# Patient Record
Sex: MALE | Race: OTHER | NOT HISPANIC OR LATINO | URBAN - METROPOLITAN AREA
[De-identification: names, ages, dates, MRNs, and addresses within clinical notes are randomized per-mention and may not be internally consistent; named-entity substitution may affect disease eponyms.]

---

## 2018-09-27 ENCOUNTER — EMERGENCY (EMERGENCY)
Facility: HOSPITAL | Age: 73
LOS: 1 days | Discharge: HOME | End: 2018-09-27
Attending: EMERGENCY MEDICINE
Payer: MEDICARE

## 2018-09-27 VITALS
HEART RATE: 78 BPM | DIASTOLIC BLOOD PRESSURE: 88 MMHG | RESPIRATION RATE: 14 BRPM | OXYGEN SATURATION: 100 % | TEMPERATURE: 98 F | WEIGHT: 134.92 LBS | SYSTOLIC BLOOD PRESSURE: 163 MMHG

## 2018-09-27 LAB
ANION GAP SERPL CALC-SCNC: 5 MMOL/L — SIGNIFICANT CHANGE UP (ref 5–17)
BUN SERPL-MCNC: 16 MG/DL — SIGNIFICANT CHANGE UP (ref 7–23)
CALCIUM SERPL-MCNC: 8.8 MG/DL — SIGNIFICANT CHANGE UP (ref 8.5–10.1)
CHLORIDE SERPL-SCNC: 106 MMOL/L — SIGNIFICANT CHANGE UP (ref 96–108)
CO2 SERPL-SCNC: 30 MMOL/L — SIGNIFICANT CHANGE UP (ref 22–31)
CREAT SERPL-MCNC: 0.88 MG/DL — SIGNIFICANT CHANGE UP (ref 0.5–1.3)
GLUCOSE SERPL-MCNC: 97 MG/DL — SIGNIFICANT CHANGE UP (ref 70–99)
HCT VFR BLD CALC: 39.9 % — SIGNIFICANT CHANGE UP (ref 39–50)
HGB BLD-MCNC: 13 G/DL — SIGNIFICANT CHANGE UP (ref 13–17)
MCHC RBC-ENTMCNC: 28.8 PG — SIGNIFICANT CHANGE UP (ref 27–34)
MCHC RBC-ENTMCNC: 32.6 GM/DL — SIGNIFICANT CHANGE UP (ref 32–36)
MCV RBC AUTO: 88.3 FL — SIGNIFICANT CHANGE UP (ref 80–100)
NRBC # BLD: 0 /100 WBCS — SIGNIFICANT CHANGE UP (ref 0–0)
PLATELET # BLD AUTO: 279 K/UL — SIGNIFICANT CHANGE UP (ref 150–400)
POTASSIUM SERPL-MCNC: 3.7 MMOL/L — SIGNIFICANT CHANGE UP (ref 3.5–5.3)
POTASSIUM SERPL-SCNC: 3.7 MMOL/L — SIGNIFICANT CHANGE UP (ref 3.5–5.3)
RBC # BLD: 4.52 M/UL — SIGNIFICANT CHANGE UP (ref 4.2–5.8)
RBC # FLD: 14.2 % — SIGNIFICANT CHANGE UP (ref 10.3–14.5)
SODIUM SERPL-SCNC: 141 MMOL/L — SIGNIFICANT CHANGE UP (ref 135–145)
TROPONIN I SERPL-MCNC: <.015 NG/ML — SIGNIFICANT CHANGE UP (ref 0.01–0.04)
WBC # BLD: 6.93 K/UL — SIGNIFICANT CHANGE UP (ref 3.8–10.5)
WBC # FLD AUTO: 6.93 K/UL — SIGNIFICANT CHANGE UP (ref 3.8–10.5)

## 2018-09-27 PROCEDURE — 71046 X-RAY EXAM CHEST 2 VIEWS: CPT

## 2018-09-27 PROCEDURE — 80048 BASIC METABOLIC PNL TOTAL CA: CPT

## 2018-09-27 PROCEDURE — 36415 COLL VENOUS BLD VENIPUNCTURE: CPT

## 2018-09-27 PROCEDURE — 85027 COMPLETE CBC AUTOMATED: CPT

## 2018-09-27 PROCEDURE — 99283 EMERGENCY DEPT VISIT LOW MDM: CPT

## 2018-09-27 PROCEDURE — 84484 ASSAY OF TROPONIN QUANT: CPT

## 2018-09-27 PROCEDURE — 71046 X-RAY EXAM CHEST 2 VIEWS: CPT | Mod: 26

## 2018-09-27 PROCEDURE — 93005 ELECTROCARDIOGRAM TRACING: CPT

## 2018-09-27 RX ORDER — RANITIDINE HYDROCHLORIDE 150 MG/1
1 TABLET, FILM COATED ORAL
Qty: 0 | Refills: 0 | COMMUNITY

## 2018-09-27 RX ORDER — TAMSULOSIN HYDROCHLORIDE 0.4 MG/1
1 CAPSULE ORAL
Qty: 0 | Refills: 0 | COMMUNITY

## 2018-09-27 RX ORDER — ASPIRIN/CALCIUM CARB/MAGNESIUM 324 MG
325 TABLET ORAL ONCE
Qty: 0 | Refills: 0 | Status: COMPLETED | OUTPATIENT
Start: 2018-09-27 | End: 2018-09-27

## 2018-09-27 RX ORDER — SIMVASTATIN 20 MG/1
1 TABLET, FILM COATED ORAL
Qty: 0 | Refills: 0 | COMMUNITY

## 2018-09-27 RX ORDER — LOSARTAN POTASSIUM 100 MG/1
0 TABLET, FILM COATED ORAL
Qty: 0 | Refills: 0 | COMMUNITY

## 2018-09-27 RX ORDER — ASPIRIN/CALCIUM CARB/MAGNESIUM 324 MG
1 TABLET ORAL
Qty: 0 | Refills: 0 | COMMUNITY

## 2018-09-27 RX ADMIN — Medication 325 MILLIGRAM(S): at 23:01

## 2018-09-27 NOTE — ED PROVIDER NOTE - OBJECTIVE STATEMENT
71 yo male with H/O HTN, High Cholesterol and GERD who presents here this evening for evaluation of chest pain, referred here from [facility]. States that he has 2-3 months of localized left lateral chest discomfort described as dull, non radiating w/o associated symptoms lasting a minimum of 4-5 hours per day, everyday. Exertion does not increase pain and rest does not decrease symptoms. No SOB/Cough/Fever or Chills and in addition no back or neck pain.

## 2018-09-27 NOTE — ED ADULT NURSE NOTE - NSIMPLEMENTINTERV_GEN_ALL_ED
Implemented All Universal Safety Interventions:  [place] to call system. Call bell, personal items and telephone within reach. Instruct patient to call for assistance. Room bathroom lighting operational. Non-slip footwear when patient is off stretcher. Physically safe environment: no spills, clutter or unnecessary equipment. Stretcher in lowest position, wheels locked, appropriate side rails in place.

## 2018-09-27 NOTE — ED ADULT TRIAGE NOTE - NS ED TRIAGE HISTORIAN
Heart size within normal limits. Pulmonary vasculature within normal limits. No focal consolidation.
 Emphysematous change. Subluxed or dislocated left shoulder with deformity of the humeral head. Patient

## 2018-10-31 PROBLEM — K21.9 GASTRO-ESOPHAGEAL REFLUX DISEASE WITHOUT ESOPHAGITIS: Chronic | Status: ACTIVE | Noted: 2018-09-27

## 2018-10-31 PROBLEM — I10 ESSENTIAL (PRIMARY) HYPERTENSION: Chronic | Status: ACTIVE | Noted: 2018-09-27

## 2018-10-31 PROBLEM — E78.00 PURE HYPERCHOLESTEROLEMIA, UNSPECIFIED: Chronic | Status: ACTIVE | Noted: 2018-09-27

## 2018-11-15 ENCOUNTER — OUTPATIENT (OUTPATIENT)
Dept: OUTPATIENT SERVICES | Facility: HOSPITAL | Age: 73
LOS: 1 days | End: 2018-11-15

## 2018-11-15 ENCOUNTER — APPOINTMENT (OUTPATIENT)
Dept: CV DIAGNOSTICS | Facility: HOSPITAL | Age: 73
End: 2018-11-15
Payer: MEDICARE

## 2018-11-15 DIAGNOSIS — R07.89 OTHER CHEST PAIN: ICD-10-CM

## 2018-11-15 PROCEDURE — 93016 CV STRESS TEST SUPVJ ONLY: CPT | Mod: GC

## 2018-11-15 PROCEDURE — 78452 HT MUSCLE IMAGE SPECT MULT: CPT | Mod: 26

## 2018-11-15 PROCEDURE — 93018 CV STRESS TEST I&R ONLY: CPT | Mod: GC

## 2022-08-26 DIAGNOSIS — M25.812 OTHER SPECIFIED JOINT DISORDERS, LEFT SHOULDER: ICD-10-CM

## 2022-08-31 ENCOUNTER — APPOINTMENT (OUTPATIENT)
Dept: ORTHOPEDIC SURGERY | Facility: CLINIC | Age: 77
End: 2022-08-31

## 2022-08-31 VITALS — HEIGHT: 62 IN | BODY MASS INDEX: 24.29 KG/M2 | WEIGHT: 132 LBS

## 2022-08-31 DIAGNOSIS — I10 ESSENTIAL (PRIMARY) HYPERTENSION: ICD-10-CM

## 2022-08-31 DIAGNOSIS — Z00.00 ENCOUNTER FOR GENERAL ADULT MEDICAL EXAMINATION W/OUT ABNORMAL FINDINGS: ICD-10-CM

## 2022-08-31 PROCEDURE — 99214 OFFICE O/P EST MOD 30 MIN: CPT

## 2022-08-31 PROCEDURE — 73010 X-RAY EXAM OF SHOULDER BLADE: CPT | Mod: RT

## 2022-08-31 PROCEDURE — 73030 X-RAY EXAM OF SHOULDER: CPT | Mod: RT

## 2022-08-31 RX ORDER — METHYLPREDNISOLONE 4 MG/1
4 TABLET ORAL
Qty: 1 | Refills: 0 | Status: ACTIVE | COMMUNITY
Start: 2022-08-31 | End: 1900-01-01

## 2022-08-31 NOTE — REASON FOR VISIT
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  Left shoulder pain began in March 2021, after getting second COVID vaccine. Celebrex and PT helped at the time.  The right shoulder pain began May 2022, without injury. Reaching is  painful. Night symptoms can occur. There can be some n/t.

## 2022-08-31 NOTE — HISTORY OF PRESENT ILLNESS
[8] : 8 [5] : 5 [Dull/Aching] : dull/aching [Household chores] : household chores [Leisure] : leisure [Work] : work [Sleep] : sleep [Lying in bed] : lying in bed [Full time] : Work status: full time [de-identified] : Pain in the right shoulder for the last 2 months after having the COVID booster.  [] : no [FreeTextEntry1] : right shoulder [FreeTextEntry6] : itching [FreeTextEntry7] : to his neck [FreeTextEntry9] : diclofenac gel

## 2022-08-31 NOTE — ASSESSMENT
[FreeTextEntry1] : We discussed the underlying pathology. \par Treatment options reviewed. \par PT is prescribed. \par MDP is prescribed. \par Follow up in 2 months. \par If symptoms persist, an MRI may be considered. \par Cautions discussed. \par Questions answered. \par \par Patient seen by Semaj Hernandez M.D.\par Entered by Abimbola Brown acting as scribe.

## 2022-08-31 NOTE — IMAGING
[de-identified] : Right Shoulder: Inspection of the shoulder/upper arm is as follows: no swelling, no ecchymosis and no atrophy. Palpation of the shoulder/upper arm is as follows: Tenderness is noted at the greater tuberosity, no tenderness at AC joint. Range of motion of the shoulder is as follows: passive forward flexion to: 165 degrees. internal rotation to: L1 degrees. external rotation with arm to side: 60 degrees. Motion is assessed sitting. The pain at end range of motions is mild. Strength of the shoulder is as follows: There is\par discomfort with strength testing. There is 4+/5 forward flexion strength. Ligament Stability and Special Tests of the shoulder is as follows: Impingement testing is positive. Additional Ligament Stability and Special Tests of the shoulder is as follows: Lombardo's test shows positive. arc is +. Neurological testing of the shoulder is as follows: No sensory deficits.\par \par Left Shoulder: Inspection of the shoulder/upper arm is as follows: no swelling, no ecchymosis and no atrophy. Palpation of the shoulder/upper arm is as follows: Tenderness is noted at the AC joint and lateral shoulder. AC is more tender. Range of motion of the shoulder is as follows: passive forward flexion to: 165 degrees. internal rotation to: L2 degrees. external rotation with arm to side: 60 degrees. Motion is assessed sitting. The pain at end range of motions is mild. Strength of the shoulder is as follows: There is\par discomfort with strength testing. There is decent strength.. Ligament Stability and Special Tests of the shoulder is as follows: Impingement testing is positive. Additional Ligament Stability and Special Tests of the shoulder is as follows: Lombardo's test shows negative. arc is +. Neurological testing of the shoulder is as follows: No sensory deficits. [Right] : right shoulder [FreeTextEntry1] : The GH joint is OK. There is an AC spur. [FreeTextEntry5] : There is a type II acromion with a very large spur.

## 2022-08-31 NOTE — CONSULT LETTER
[Dear  ___] : Dear  [unfilled], [Consult Letter:] : I had the pleasure of evaluating your patient, [unfilled]. [Please see my note below.] : Please see my note below. [Consult Closing:] : Thank you very much for allowing me to participate in the care of this patient.  If you have any questions, please do not hesitate to contact me. [Sincerely,] : Sincerely, [FreeTextEntry3] : Semaj Shin M.D.\par Shoulder Surgery

## 2022-09-03 ENCOUNTER — NON-APPOINTMENT (OUTPATIENT)
Age: 77
End: 2022-09-03

## 2022-09-20 ENCOUNTER — APPOINTMENT (OUTPATIENT)
Dept: ORTHOPEDIC SURGERY | Facility: CLINIC | Age: 77
End: 2022-09-20

## 2022-10-19 ENCOUNTER — APPOINTMENT (OUTPATIENT)
Dept: ORTHOPEDIC SURGERY | Facility: CLINIC | Age: 77
End: 2022-10-19

## 2022-10-19 VITALS — WEIGHT: 130 LBS | BODY MASS INDEX: 23.92 KG/M2 | HEIGHT: 62 IN

## 2022-10-19 DIAGNOSIS — M25.512 PAIN IN LEFT SHOULDER: ICD-10-CM

## 2022-10-19 PROCEDURE — 99214 OFFICE O/P EST MOD 30 MIN: CPT

## 2022-10-19 RX ORDER — CELECOXIB 200 MG/1
200 CAPSULE ORAL TWICE DAILY
Qty: 60 | Refills: 0 | Status: COMPLETED | COMMUNITY
Start: 2022-10-19 | End: 2022-11-18

## 2022-10-19 NOTE — ASSESSMENT
[FreeTextEntry1] : We discussed the underlying pathology. \par PT is prescribed. \par Celebrex is renewed.\par Questions answered. \par \par Patient seen by Semaj Hernandez M.D.\par Entered by Abimbola Brown acting as scribe.

## 2022-10-19 NOTE — HISTORY OF PRESENT ILLNESS
[5] : 5 [Burning] : burning [] : yes [Constant] : constant [Sleep] : sleep [Ice] : ice [Part time] : Work status: part time [de-identified] : Patient is here for a follow up on his right shoulder. [FreeTextEntry1] : right shoulder [FreeTextEntry7] : into the bicep [de-identified] : lifting [de-identified] : Stretching

## 2022-10-19 NOTE — REASON FOR VISIT
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  Left shoulder pain began in March 2021, after getting second COVID vaccine. Celebrex and PT helped at the time.  The right shoulder pain began May 2022, without injury. Reaching is  painful. Night symptoms can occur. There can be some n/t. He has not started PT yet. Overall, he feels better though there is still pain. MDP helped a lot, and he feels 50% better.

## 2022-10-19 NOTE — IMAGING
[Right] : right shoulder [de-identified] : Right Shoulder: Inspection of the shoulder/upper arm is as follows: no swelling, no ecchymosis and no atrophy. Palpation of the shoulder/upper arm is as follows: Tenderness is noted at the greater tuberosity, no tenderness at AC joint. Range of motion of the shoulder is as follows: passive forward flexion to: 165 degrees. internal rotation to: L1 degrees. external rotation with arm to side: 60 degrees. Motion is assessed sitting. The pain at end range of motions is mild. Strength of the shoulder is as follows: There is\par discomfort with strength testing. There is 4+/5 forward flexion strength. Ligament Stability and Special Tests of the shoulder is as follows: Impingement testing is positive. Additional Ligament Stability and Special Tests of the shoulder is as follows: Lombardo's test shows positive. arc is +. Neurological testing of the shoulder is as follows: No sensory deficits.\par \par Left Shoulder: Inspection of the shoulder/upper arm is as follows: no swelling, no ecchymosis and no atrophy. Palpation of the shoulder/upper arm is as follows: Tenderness is noted at the AC joint and lateral shoulder. AC is more tender. Range of motion of the shoulder is as follows: passive forward flexion to: 165 degrees. internal rotation to: L2 degrees. external rotation with arm to side: 60 degrees. Motion is assessed sitting. The pain at end range of motions is mild. Strength of the shoulder is as follows: There is\par discomfort with strength testing. There is decent strength.. Ligament Stability and Special Tests of the shoulder is as follows: Impingement testing is positive. Additional Ligament Stability and Special Tests of the shoulder is as follows: Lombardo's test shows negative. arc is +. Neurological testing of the shoulder is as follows: No sensory deficits. [FreeTextEntry1] : The GH joint is OK. There is an AC spur. [FreeTextEntry5] : There is a type II acromion with a very large spur.

## 2022-11-01 ENCOUNTER — APPOINTMENT (OUTPATIENT)
Dept: ORTHOPEDIC SURGERY | Facility: CLINIC | Age: 77
End: 2022-11-01

## 2022-11-01 VITALS — HEIGHT: 62 IN | WEIGHT: 130 LBS | BODY MASS INDEX: 23.92 KG/M2

## 2022-11-01 PROCEDURE — 72040 X-RAY EXAM NECK SPINE 2-3 VW: CPT

## 2022-11-01 PROCEDURE — 99214 OFFICE O/P EST MOD 30 MIN: CPT

## 2022-11-01 NOTE — PHYSICAL EXAM
[Normal Mood and Affect] : normal mood and affect [Orientated] : orientated [Able to Communicate] : able to communicate [Well Developed] : well developed [Well Nourished] : well nourished [NL (30)] : right lateral bending 30 degrees [Disc space narrowing] : Disc space narrowing [FreeTextEntry1] : DDD C5=C6 and C6-C7 [de-identified] : extension 20 degrees [de-identified] : left lateral flexion 10 degrees [de-identified] : left lateral rotation 45 degrees [de-identified] : right lateral flexion 10 degrees [TWNoteComboBox6] : right lateral rotation 45 degrees [] : non-antalgic [FreeTextEntry8] : Coccyx [TWNoteComboBox7] : forward flexion 75 degrees

## 2022-11-01 NOTE — HISTORY OF PRESENT ILLNESS
[10] : 10 [8] : 8 [Dull/Aching] : dull/aching [Constant] : constant [Standing] : standing [Bending forward] : bending forward [Lying in bed] : lying in bed [Part time] : Work status: part time [de-identified] : 11/01/22:  Returns today c/o persistent LBP. has been going to PT which has helped. Would like to continue therapy. Also woke up c/o stiffness in hihs neck assoc w/ some dizziness.\par \par  3/14/22 Return visit for this 76 year old male here today c/o persistent LBP when sitting long periods and standing for a long time. Still with some lateral right elbow pain and left shoulder pain. Would like a refill on Celebrex, which he feels helps him. [FreeTextEntry1] : low back [de-identified] : pt

## 2022-12-27 ENCOUNTER — RX RENEWAL (OUTPATIENT)
Age: 77
End: 2022-12-27

## 2022-12-27 RX ORDER — DICLOFENAC SODIUM 1% 10 MG/G
1 GEL TOPICAL DAILY
Qty: 400 | Refills: 1 | Status: ACTIVE | COMMUNITY
Start: 2022-11-01 | End: 1900-01-01

## 2023-02-09 ENCOUNTER — APPOINTMENT (OUTPATIENT)
Dept: ORTHOPEDIC SURGERY | Facility: CLINIC | Age: 78
End: 2023-02-09
Payer: MEDICARE

## 2023-02-09 PROCEDURE — 99213 OFFICE O/P EST LOW 20 MIN: CPT

## 2023-02-09 RX ORDER — MELOXICAM 15 MG/1
15 TABLET ORAL DAILY
Qty: 30 | Refills: 5 | Status: ACTIVE | COMMUNITY
Start: 2023-02-09 | End: 1900-01-01

## 2023-02-09 NOTE — PHYSICAL EXAM
[Normal Mood and Affect] : normal mood and affect [Orientated] : orientated [Able to Communicate] : able to communicate [Well Developed] : well developed [Well Nourished] : well nourished [Disc space narrowing] : Disc space narrowing [NL (30)] : right lateral bending 30 degrees [FreeTextEntry1] : DDD C5=C6 and C6-C7 [de-identified] : extension 20 degrees [de-identified] : left lateral flexion 10 degrees [de-identified] : left lateral rotation 45 degrees [de-identified] : right lateral flexion 10 degrees [TWNoteComboBox6] : right lateral rotation 45 degrees [] : non-antalgic [FreeTextEntry8] : Coccyx [TWNoteComboBox7] : forward flexion 75 degrees

## 2023-02-09 NOTE — HISTORY OF PRESENT ILLNESS
[Neck] : neck [Lower back] : lower back [7] : 7 [5] : 5 [Standing] : standing [Walking/activity] : walking/activity [Sitting] : sitting [de-identified] : 02/09/23:  Returns today for his neck and back.  Is in PT twice a week and using diclofenac gel which has really helped. Would like to continue PT 2-3x/month should suffice.\par \par 11/01/22:  Returns today c/o persistent LBP. has been going to PT which has helped. Would like to continue therapy. Also woke up c/o stiffness in his neck assoc w/ some dizziness.\par \par  3/14/22 Return visit for this 76 year old male here today c/o persistent LBP when sitting long periods and standing for a long time. Still with some lateral right elbow pain and left shoulder pain. Would like a refill on Celebrex, which he feels helps him. [] : no [de-identified] : 11/01/22 [de-identified] : [facility] [de-identified] : x-ray

## 2023-03-28 ENCOUNTER — APPOINTMENT (OUTPATIENT)
Dept: DERMATOLOGY | Facility: CLINIC | Age: 78
End: 2023-03-28
Payer: MEDICARE

## 2023-03-28 PROCEDURE — 99203 OFFICE O/P NEW LOW 30 MIN: CPT

## 2023-03-28 RX ORDER — MOMETASONE FUROATE 1 MG/G
0.1 CREAM TOPICAL
Qty: 1 | Refills: 1 | Status: ACTIVE | COMMUNITY
Start: 2023-03-28 | End: 1900-01-01

## 2023-05-25 ENCOUNTER — APPOINTMENT (OUTPATIENT)
Dept: ORTHOPEDIC SURGERY | Facility: CLINIC | Age: 78
End: 2023-05-25
Payer: MEDICARE

## 2023-05-25 PROCEDURE — 99213 OFFICE O/P EST LOW 20 MIN: CPT

## 2023-05-25 NOTE — PHYSICAL EXAM
[Normal Mood and Affect] : normal mood and affect [Orientated] : orientated [Able to Communicate] : able to communicate [Well Developed] : well developed [Well Nourished] : well nourished [Disc space narrowing] : Disc space narrowing [NL (30)] : right lateral bending 30 degrees [Flexion] : flexion [FreeTextEntry1] : DDD C5=C6 and C6-C7 [de-identified] : extension 20 degrees [de-identified] : left lateral flexion 10 degrees [de-identified] : left lateral rotation 45 degrees [de-identified] : right lateral flexion 10 degrees [TWNoteComboBox6] : right lateral rotation 45 degrees [] : non-antalgic [FreeTextEntry8] : Coccyx [TWNoteComboBox7] : forward flexion 75 degrees

## 2023-05-25 NOTE — HISTORY OF PRESENT ILLNESS
[Neck] : neck [Lower back] : lower back [7] : 7 [5] : 5 [Constant] : constant [Standing] : standing [Walking] : walking [de-identified] : 05/25/23:  Here today for recurrent lower back pain x last 4 weeks duration..  Had been going to PT, the last time earlier this month, and needs a new script.  Painful when lifting and walking or standing for longer than 1/2 hour. Some pain radiating down his posterior thigh.   Pain wakes him.  Uses diclofenac gel and meloxicam 7.5 mg daily.\par \par 02/09/23:  Returns today for his neck and back.  Is in PT twice a week and using diclofenac gel which has really helped. Would like to continue PT 2-3x/month should suffice.\par \par 11/01/22:  Returns today c/o persistent LBP. has been going to PT which has helped. Would like to continue therapy. Also woke up c/o stiffness in his neck assoc w/ some dizziness.\par \par  3/14/22 Return visit for this 76 year old male here today c/o persistent LBP when sitting long periods and standing for a long time. Still with some lateral right elbow pain and left shoulder pain. Would like a refill on Celebrex, which he feels helps him. [] : no [FreeTextEntry7] : numbness posterior leg [FreeTextEntry9] : Diclofenac gel, meloxicam [de-identified] : 02/2023 [de-identified] : [facility] [de-identified] : early May 2023 [de-identified] : x-ray [de-identified] : PT, diclofenac gel and meloxicam

## 2023-06-20 ENCOUNTER — APPOINTMENT (OUTPATIENT)
Dept: DERMATOLOGY | Facility: CLINIC | Age: 78
End: 2023-06-20
Payer: MEDICARE

## 2023-06-20 DIAGNOSIS — L30.9 DERMATITIS, UNSPECIFIED: ICD-10-CM

## 2023-06-20 DIAGNOSIS — L71.9 ROSACEA, UNSPECIFIED: ICD-10-CM

## 2023-06-20 PROCEDURE — 99214 OFFICE O/P EST MOD 30 MIN: CPT

## 2023-08-17 DIAGNOSIS — M51.36 OTHER INTERVERTEBRAL DISC DEGENERATION, LUMBAR REGION: ICD-10-CM

## 2023-08-17 DIAGNOSIS — M48.061 SPINAL STENOSIS, LUMBAR REGION WITHOUT NEUROGENIC CLAUDICATION: ICD-10-CM

## 2023-11-03 ENCOUNTER — APPOINTMENT (OUTPATIENT)
Dept: ORTHOPEDIC SURGERY | Facility: CLINIC | Age: 78
End: 2023-11-03
Payer: MEDICARE

## 2023-11-03 VITALS — HEIGHT: 62 IN | BODY MASS INDEX: 24.29 KG/M2 | WEIGHT: 132 LBS

## 2023-11-03 DIAGNOSIS — S63.501A UNSPECIFIED SPRAIN OF RIGHT WRIST, INITIAL ENCOUNTER: ICD-10-CM

## 2023-11-03 PROCEDURE — 99214 OFFICE O/P EST MOD 30 MIN: CPT | Mod: 25

## 2023-11-03 PROCEDURE — L3908: CPT | Mod: RT

## 2023-11-03 PROCEDURE — 73110 X-RAY EXAM OF WRIST: CPT | Mod: RT

## 2023-11-03 RX ORDER — TAMSULOSIN HYDROCHLORIDE 0.4 MG/1
0.4 CAPSULE ORAL
Refills: 0 | Status: ACTIVE | COMMUNITY

## 2023-11-03 RX ORDER — ASPIRIN 81 MG
81 TABLET, DELAYED RELEASE (ENTERIC COATED) ORAL
Refills: 0 | Status: ACTIVE | COMMUNITY

## 2023-11-03 RX ORDER — SIMVASTATIN 80 MG/1
TABLET, FILM COATED ORAL
Refills: 0 | Status: ACTIVE | COMMUNITY

## 2023-11-03 RX ORDER — METOPROLOL TARTRATE 75 MG/1
TABLET, FILM COATED ORAL
Refills: 0 | Status: ACTIVE | COMMUNITY

## 2023-11-03 RX ORDER — METHYLPREDNISOLONE 4 MG/1
4 TABLET ORAL
Qty: 1 | Refills: 1 | Status: ACTIVE | COMMUNITY
Start: 2023-11-03 | End: 1900-01-01

## 2023-11-03 RX ORDER — FAMOTIDINE 20 MG/1
20 TABLET, FILM COATED ORAL
Refills: 0 | Status: ACTIVE | COMMUNITY

## 2023-11-06 ENCOUNTER — APPOINTMENT (OUTPATIENT)
Dept: ORTHOPEDIC SURGERY | Facility: CLINIC | Age: 78
End: 2023-11-06

## 2023-11-09 ENCOUNTER — APPOINTMENT (OUTPATIENT)
Dept: ORTHOPEDIC SURGERY | Facility: CLINIC | Age: 78
End: 2023-11-09
Payer: MEDICARE

## 2023-11-09 VITALS — WEIGHT: 132 LBS | HEIGHT: 62 IN | BODY MASS INDEX: 24.29 KG/M2

## 2023-11-09 DIAGNOSIS — S63.501D UNSPECIFIED SPRAIN OF RIGHT WRIST, SUBSEQUENT ENCOUNTER: ICD-10-CM

## 2023-11-09 PROCEDURE — 99213 OFFICE O/P EST LOW 20 MIN: CPT

## 2023-12-16 ENCOUNTER — RX RENEWAL (OUTPATIENT)
Age: 78
End: 2023-12-16

## 2024-01-24 ENCOUNTER — APPOINTMENT (OUTPATIENT)
Dept: ORTHOPEDIC SURGERY | Facility: CLINIC | Age: 79
End: 2024-01-24
Payer: MEDICARE

## 2024-01-24 VITALS — HEIGHT: 62 IN | WEIGHT: 131 LBS | BODY MASS INDEX: 24.11 KG/M2

## 2024-01-24 DIAGNOSIS — E78.00 PURE HYPERCHOLESTEROLEMIA, UNSPECIFIED: ICD-10-CM

## 2024-01-24 DIAGNOSIS — M75.42 IMPINGEMENT SYNDROME OF LEFT SHOULDER: ICD-10-CM

## 2024-01-24 DIAGNOSIS — M75.41 IMPINGEMENT SYNDROME OF RIGHT SHOULDER: ICD-10-CM

## 2024-01-24 PROCEDURE — 73030 X-RAY EXAM OF SHOULDER: CPT | Mod: 50

## 2024-01-24 PROCEDURE — 73010 X-RAY EXAM OF SHOULDER BLADE: CPT | Mod: 50

## 2024-01-24 PROCEDURE — 99214 OFFICE O/P EST MOD 30 MIN: CPT

## 2024-01-24 RX ORDER — METHYLPREDNISOLONE 4 MG/1
4 TABLET ORAL
Qty: 1 | Refills: 0 | Status: ACTIVE | COMMUNITY
Start: 2024-01-24 | End: 1900-01-01

## 2024-01-24 RX ORDER — DICLOFENAC SODIUM 1% 10 MG/G
1 GEL TOPICAL DAILY
Qty: 1 | Refills: 1 | Status: ACTIVE | COMMUNITY
Start: 2024-01-24 | End: 1900-01-01

## 2024-01-24 NOTE — PHYSICAL EXAM
[Right] : right shoulder [NL (0-180)] : full active abduction 0-180 degrees [] : no AC joint tenderness [FreeTextEntry9] : IR range to T12 [de-identified] : external rotation at 90 degrees of abduction 90 degrees

## 2024-01-24 NOTE — HISTORY OF PRESENT ILLNESS
[de-identified] : 01/24/2024 : SHEILA TREVIZO is a RHD 78 year male presenting today for b/l shoulder pain since 6/1/23, getting progressively worse since 12/24/23 no JOSUÉ, gradual onset. Describes pain as R>L. Worse with overhead movements, lifting, carrying. Better with rest, NSAIDs. Prior treatment  10/2022, left shoulder; PT, diclofenac gel, MDP with relief. Occupation: [employer], requires some heavy lifting.

## 2024-01-24 NOTE — DISCUSSION/SUMMARY
[de-identified] : 78m with b/l shoulder impingement, complaints are R>L 1) discussed csi - pt wishes to defer today 2) MDP rx, diclofenac gel rx 3) .cryotherapy, rest and activity modification 4) will rtc after an upcoming vacation to consider csi   Entered by Jane Hernandez acting as scribe. Dr. Jasso- The documentation recorded by the scribe accurately reflects the service I personally performed and the decisions made by me.

## 2024-03-26 ENCOUNTER — APPOINTMENT (OUTPATIENT)
Dept: ORTHOPEDIC SURGERY | Facility: CLINIC | Age: 79
End: 2024-03-26
Payer: MEDICARE

## 2024-03-26 VITALS — WEIGHT: 130 LBS | BODY MASS INDEX: 23.92 KG/M2 | HEIGHT: 62 IN

## 2024-03-26 DIAGNOSIS — M77.11 LATERAL EPICONDYLITIS, RIGHT ELBOW: ICD-10-CM

## 2024-03-26 DIAGNOSIS — M19.022 PRIMARY OSTEOARTHRITIS, LEFT ELBOW: ICD-10-CM

## 2024-03-26 DIAGNOSIS — Z78.9 OTHER SPECIFIED HEALTH STATUS: ICD-10-CM

## 2024-03-26 PROCEDURE — 99214 OFFICE O/P EST MOD 30 MIN: CPT

## 2024-03-26 PROCEDURE — 73080 X-RAY EXAM OF ELBOW: CPT | Mod: LT

## 2024-03-26 NOTE — PLAN
[TextEntry] : The patient was advised of the diagnosis. The natural history of the pathology was explained in full to the patient in layman's terms. All questions were answered. The risks and benefits of surgical and non-surgical treatment alternatives were explained in full to the patient.   Defers cortisone injection today.  Diclofenac 100 mg prescribed.  Patient is being referred for physical therapy for various modalities.   If no improvement, will return for cortisone injection.

## 2024-03-26 NOTE — PHYSICAL EXAM
[NL (150)] : flexion 150 degrees [NL (90)] : supination 90 degrees [5___] : supination 5[unfilled]/5 [Able to Communicate] : able to communicate [Normal Mood and Affect] : normal mood and affect [Well Developed] : well developed [Well Nourished] : well nourished [Left] : left elbow [There are no fractures, subluxations or dislocations. No significant abnormalities are seen] : There are no fractures, subluxations or dislocations. No significant abnormalities are seen [] : no lateral elbow pain with resisted forearm supination [Degenerative change] : Degenerative change [FreeTextEntry1] : Narrowing proximal radioulnar joint.  Bone spur medial aspect proximal ulna.

## 2024-03-26 NOTE — HISTORY OF PRESENT ILLNESS
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 Worse gripping and grasping. Tried Voltaren Gel 1 % w/o relief.  PMH: NO prior lt elbow issues. [] : no [FreeTextEntry1] : left elbow [FreeTextEntry7] : upper arm [de-identified] : clenching  lifting [de-identified] : none

## 2024-03-28 PROBLEM — M77.11 LATERAL EPICONDYLITIS OF RIGHT ELBOW: Status: ACTIVE | Noted: 2024-03-28

## 2024-04-25 ENCOUNTER — APPOINTMENT (OUTPATIENT)
Dept: ORTHOPEDIC SURGERY | Facility: CLINIC | Age: 79
End: 2024-04-25
Payer: MEDICARE

## 2024-04-25 VITALS — HEIGHT: 62 IN | WEIGHT: 130 LBS | BODY MASS INDEX: 23.92 KG/M2

## 2024-04-25 DIAGNOSIS — S63.502A UNSPECIFIED SPRAIN OF LEFT WRIST, INITIAL ENCOUNTER: ICD-10-CM

## 2024-04-25 DIAGNOSIS — M77.12 LATERAL EPICONDYLITIS, LEFT ELBOW: ICD-10-CM

## 2024-04-25 PROCEDURE — L3809: CPT

## 2024-04-25 PROCEDURE — 99213 OFFICE O/P EST LOW 20 MIN: CPT | Mod: 25

## 2024-04-25 PROCEDURE — L3908: CPT | Mod: KX,LT

## 2024-04-25 RX ORDER — LOSARTAN POTASSIUM 100 MG/1
TABLET, FILM COATED ORAL
Refills: 0 | Status: ACTIVE | COMMUNITY

## 2024-04-25 RX ORDER — FAMOTIDINE 20 MG/1
20 TABLET, FILM COATED ORAL
Refills: 0 | Status: ACTIVE | COMMUNITY

## 2024-04-25 RX ORDER — MELOXICAM 15 MG/1
15 TABLET ORAL DAILY
Qty: 30 | Refills: 5 | Status: ACTIVE | COMMUNITY
Start: 2024-04-25 | End: 1900-01-01

## 2024-04-25 RX ORDER — DICLOFENAC SODIUM 1% 10 MG/G
1 GEL TOPICAL DAILY
Qty: 1 | Refills: 3 | Status: ACTIVE | COMMUNITY
Start: 2024-04-25 | End: 1900-01-01

## 2024-04-25 NOTE — HISTORY OF PRESENT ILLNESS
[6] : 6 [0] : 0 [Burning] : burning [Dull/Aching] : dull/aching [Meds] : meds [Part time] : Work status: part time [de-identified] : 4/25/24 - Patient is returning for some continued pain lateral left elbow.  Pain is a little better - physical therapy has helped.  Has not yet had a cortisone injection.   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 Worse gripping and grasping. Tried Voltaren Gel 1 % w/o relief.  PMH: NO prior lt elbow issues. [] : no [FreeTextEntry1] : left elbow [FreeTextEntry7] : arm [de-identified] : clenching  lifting [de-identified] : PT at [facility] - has been helping

## 2024-04-25 NOTE — PHYSICAL EXAM
[Normal Mood and Affect] : normal mood and affect [Able to Communicate] : able to communicate [Well Developed] : well developed [Well Nourished] : well nourished [NL (150)] : flexion 150 degrees [NL (90)] : supination 90 degrees [5___] : supination 5[unfilled]/5 [There are no fractures, subluxations or dislocations. No significant abnormalities are seen] : There are no fractures, subluxations or dislocations. No significant abnormalities are seen [Degenerative change] : Degenerative change [FreeTextEntry1] : Narrowing proximal radioulnar joint.  Bone spur medial aspect proximal ulna. [Left] : left hand [] : no ecchymosis

## 2024-04-25 NOTE — PLAN
[TextEntry] : The patient was advised of the diagnosis. The natural history of the pathology was explained in full to the patient in layman's terms. All questions were answered. The risks and benefits of surgical and non-surgical treatment alternatives were explained in full to the patient.   Defers cortisone injection today.  Meloxicam that he has at home, up to 15 mg.  Diclofenac Gel prescribed.  Patient was advised to continue with physical therapy.  New script given for both elbows.  If no improvement, will return for cortisone injection.  Fitted with a wrist cock-up splint today for the left side.   No work for the next three weeks starting from 05/01/24.

## 2024-05-16 ENCOUNTER — RX RENEWAL (OUTPATIENT)
Age: 79
End: 2024-05-16

## 2024-05-16 RX ORDER — DICLOFENAC SODIUM 1% 10 MG/G
1 GEL TOPICAL
Qty: 300 | Refills: 1 | Status: ACTIVE | COMMUNITY
Start: 2023-02-09 | End: 1900-01-01

## 2024-05-16 RX ORDER — DICLOFENAC SODIUM 100 MG/1
100 TABLET, FILM COATED, EXTENDED RELEASE ORAL
Qty: 90 | Refills: 1 | Status: ACTIVE | COMMUNITY
Start: 2024-03-26 | End: 1900-01-01

## 2024-06-03 ENCOUNTER — RX RENEWAL (OUTPATIENT)
Age: 79
End: 2024-06-03

## 2024-06-03 RX ORDER — MELOXICAM 7.5 MG/1
7.5 TABLET ORAL DAILY
Qty: 90 | Refills: 1 | Status: ACTIVE | COMMUNITY
Start: 2023-05-16 | End: 1900-01-01

## 2024-06-18 ENCOUNTER — APPOINTMENT (OUTPATIENT)
Dept: ORTHOPEDIC SURGERY | Facility: CLINIC | Age: 79
End: 2024-06-18
Payer: MEDICARE

## 2024-06-18 VITALS — HEIGHT: 62 IN | WEIGHT: 130 LBS | BODY MASS INDEX: 23.92 KG/M2

## 2024-06-18 DIAGNOSIS — M47.812 SPONDYLOSIS W/OUT MYELOPATHY OR RADICULOPATHY, CERVICAL REGION: ICD-10-CM

## 2024-06-18 PROCEDURE — 72040 X-RAY EXAM NECK SPINE 2-3 VW: CPT

## 2024-06-18 PROCEDURE — 99214 OFFICE O/P EST MOD 30 MIN: CPT

## 2024-06-18 NOTE — PHYSICAL EXAM
[Normal Mood and Affect] : normal mood and affect [Oriented] : oriented [Able to Communicate] : able to communicate [Well Developed] : well developed [Well Nourished] : well nourished [Disc space narrowing] : Disc space narrowing [NL (30)] : right lateral bending 30 degrees [NL (45)] : forward flexion 45 degrees [Rotation to left] : rotation to left [Rotation to right] : rotation to right [FreeTextEntry1] : DDD C5-C6 and C6-C7 and C7-T1.  Some progression since 2022. [de-identified] : extension 30 degrees [de-identified] : left lateral flexion 10 degrees [de-identified] : left lateral rotation 25 degrees [de-identified] : right lateral flexion 10 degrees [TWNoteComboBox6] : right lateral rotation 25 degrees [] : non-antalgic [FreeTextEntry8] : Coccyx [TWNoteComboBox7] : forward flexion 75 degrees

## 2024-06-18 NOTE — HISTORY OF PRESENT ILLNESS
[Neck] : neck [8] : 8 [0] : 0 [Tightness] : tightness [Constant] : constant [Household chores] : household chores [Leisure] : leisure [Sleep] : sleep [Meds] : meds [de-identified] : 06/18/24:  Return visit for a 78 year old male complaining of spontaneous onset of neck pain and stiffness x last 1 weeks duration. No hx of trauma. Constant and daily pain.  Pain scale currently a 6, but had been a 9.  11/01/22:  Returns today c/o persistent LBP. has been going to PT which has helped. Would like to continue therapy. Also woke up c/o stiffness in [facility] neck assoc w/ some dizziness.   3/14/22 Return visit for this 76 year old male here today c/o persistent LBP when sitting long periods and standing for a long time. Still with some lateral right elbow pain and left shoulder pain. Would like a refill on Celebrex, which he feels helps him. [] : no [FreeTextEntry5] : Pain began last week with stiff neck. Reports occasional numbness in MARIANO hands. No known injury.  [FreeTextEntry7] : Numbness down MARIANO hands [FreeTextEntry9] : Stretching  [de-identified] : Movement

## 2024-06-18 NOTE — PLAN
[TextEntry] : We discussed at length with the patient the options for treatment.  We discussed conservative care including physical therapy, acupuncture, massage therapy, and chiropractic care.  We discussed injection therapy and even surgical intervention should the patient fail conservative care.  We discussed risks, benefits, complications, alternatives, outcomes, expectations.  All questions answered.   Voltaren Gel on his neck and back.   Patient is being referred for physical therapy for various modalities for his neck and back.

## 2024-07-08 ENCOUNTER — APPOINTMENT (OUTPATIENT)
Dept: ORTHOPEDIC SURGERY | Facility: CLINIC | Age: 79
End: 2024-07-08
Payer: MEDICARE

## 2024-07-08 VITALS — HEIGHT: 62 IN | BODY MASS INDEX: 23.92 KG/M2 | WEIGHT: 130 LBS

## 2024-07-08 DIAGNOSIS — M25.812 OTHER SPECIFIED JOINT DISORDERS, LEFT SHOULDER: ICD-10-CM

## 2024-07-08 DIAGNOSIS — M47.812 SPONDYLOSIS W/OUT MYELOPATHY OR RADICULOPATHY, CERVICAL REGION: ICD-10-CM

## 2024-07-08 PROCEDURE — 99213 OFFICE O/P EST LOW 20 MIN: CPT

## 2024-07-08 RX ORDER — TIZANIDINE 2 MG/1
2 TABLET ORAL EVERY 6 HOURS
Qty: 60 | Refills: 0 | Status: ACTIVE | COMMUNITY
Start: 2024-07-08 | End: 1900-01-01

## 2024-07-08 RX ORDER — MIRABEGRON 50 MG/1
TABLET, FILM COATED, EXTENDED RELEASE ORAL
Refills: 0 | Status: ACTIVE | COMMUNITY

## 2024-07-08 RX ORDER — METHYLPREDNISOLONE 4 MG/1
4 TABLET ORAL
Qty: 1 | Refills: 0 | Status: ACTIVE | COMMUNITY
Start: 2024-07-08 | End: 1900-01-01

## 2024-07-22 ENCOUNTER — APPOINTMENT (OUTPATIENT)
Dept: ORTHOPEDIC SURGERY | Facility: CLINIC | Age: 79
End: 2024-07-22

## 2024-09-23 ENCOUNTER — RX RENEWAL (OUTPATIENT)
Age: 79
End: 2024-09-23

## 2024-10-22 ENCOUNTER — RX RENEWAL (OUTPATIENT)
Age: 79
End: 2024-10-22

## 2024-12-26 ENCOUNTER — RX RENEWAL (OUTPATIENT)
Age: 79
End: 2024-12-26

## 2025-01-14 ENCOUNTER — APPOINTMENT (OUTPATIENT)
Dept: ORTHOPEDIC SURGERY | Facility: CLINIC | Age: 80
End: 2025-01-14
Payer: MEDICARE

## 2025-01-14 VITALS — BODY MASS INDEX: 23.04 KG/M2 | HEIGHT: 63 IN | WEIGHT: 130 LBS

## 2025-01-14 DIAGNOSIS — M75.41 IMPINGEMENT SYNDROME OF RIGHT SHOULDER: ICD-10-CM

## 2025-01-14 DIAGNOSIS — M75.42 IMPINGEMENT SYNDROME OF LEFT SHOULDER: ICD-10-CM

## 2025-01-14 PROCEDURE — 99213 OFFICE O/P EST LOW 20 MIN: CPT

## 2025-02-03 ENCOUNTER — INPATIENT (INPATIENT)
Facility: HOSPITAL | Age: 80
LOS: 0 days | Discharge: HOME | End: 2025-02-04
Attending: INTERNAL MEDICINE | Admitting: INTERNAL MEDICINE
Payer: MEDICARE

## 2025-02-03 VITALS
OXYGEN SATURATION: 98 % | HEIGHT: 62 IN | RESPIRATION RATE: 15 BRPM | SYSTOLIC BLOOD PRESSURE: 145 MMHG | WEIGHT: 128.09 LBS | DIASTOLIC BLOOD PRESSURE: 79 MMHG | HEART RATE: 74 BPM | TEMPERATURE: 98 F

## 2025-02-03 DIAGNOSIS — R94.39 ABNORMAL RESULT OF OTHER CARDIOVASCULAR FUNCTION STUDY: ICD-10-CM

## 2025-02-03 LAB
ANION GAP SERPL CALC-SCNC: 13 MMOL/L — SIGNIFICANT CHANGE UP (ref 7–14)
BUN SERPL-MCNC: 14 MG/DL — SIGNIFICANT CHANGE UP (ref 7–23)
CALCIUM SERPL-MCNC: 9.9 MG/DL — SIGNIFICANT CHANGE UP (ref 8.4–10.5)
CHLORIDE SERPL-SCNC: 105 MMOL/L — SIGNIFICANT CHANGE UP (ref 98–107)
CO2 SERPL-SCNC: 26 MMOL/L — SIGNIFICANT CHANGE UP (ref 22–31)
CREAT SERPL-MCNC: 0.83 MG/DL — SIGNIFICANT CHANGE UP (ref 0.5–1.3)
EGFR: 90 ML/MIN/1.73M2 — SIGNIFICANT CHANGE UP
GLUCOSE SERPL-MCNC: 107 MG/DL — HIGH (ref 70–99)
HCT VFR BLD CALC: 45.3 % — SIGNIFICANT CHANGE UP (ref 39–50)
HGB BLD-MCNC: 13.9 G/DL — SIGNIFICANT CHANGE UP (ref 13–17)
MCHC RBC-ENTMCNC: 28.4 PG — SIGNIFICANT CHANGE UP (ref 27–34)
MCHC RBC-ENTMCNC: 30.7 G/DL — LOW (ref 32–36)
MCV RBC AUTO: 92.4 FL — SIGNIFICANT CHANGE UP (ref 80–100)
NRBC # BLD AUTO: 0 K/UL — SIGNIFICANT CHANGE UP (ref 0–0)
NRBC # BLD: 0 /100 WBCS — SIGNIFICANT CHANGE UP (ref 0–0)
NRBC # FLD: 0 K/UL — SIGNIFICANT CHANGE UP (ref 0–0)
NRBC BLD-RTO: 0 /100 WBCS — SIGNIFICANT CHANGE UP (ref 0–0)
PLATELET # BLD AUTO: 255 K/UL — SIGNIFICANT CHANGE UP (ref 150–400)
POTASSIUM SERPL-MCNC: 4 MMOL/L — SIGNIFICANT CHANGE UP (ref 3.5–5.3)
POTASSIUM SERPL-SCNC: 4 MMOL/L — SIGNIFICANT CHANGE UP (ref 3.5–5.3)
RBC # BLD: 4.9 M/UL — SIGNIFICANT CHANGE UP (ref 4.2–5.8)
RBC # FLD: 13.3 % — SIGNIFICANT CHANGE UP (ref 10.3–14.5)
SODIUM SERPL-SCNC: 144 MMOL/L — SIGNIFICANT CHANGE UP (ref 135–145)
WBC # BLD: 5.94 K/UL — SIGNIFICANT CHANGE UP (ref 3.8–10.5)
WBC # FLD AUTO: 5.94 K/UL — SIGNIFICANT CHANGE UP (ref 3.8–10.5)

## 2025-02-03 PROCEDURE — 93010 ELECTROCARDIOGRAM REPORT: CPT

## 2025-02-03 PROCEDURE — 99152 MOD SED SAME PHYS/QHP 5/>YRS: CPT

## 2025-02-03 PROCEDURE — 92978 ENDOLUMINL IVUS OCT C 1ST: CPT | Mod: 26,RC

## 2025-02-03 PROCEDURE — 92928 PRQ TCAT PLMT NTRAC ST 1 LES: CPT | Mod: RC

## 2025-02-03 PROCEDURE — 93458 L HRT ARTERY/VENTRICLE ANGIO: CPT | Mod: 26,59

## 2025-02-03 RX ORDER — BACTERIOSTATIC SODIUM CHLORIDE 0.9 %
3 VIAL (ML) INJECTION EVERY 8 HOURS
Refills: 0 | Status: DISCONTINUED | OUTPATIENT
Start: 2025-02-03 | End: 2025-02-04

## 2025-02-03 RX ORDER — TIZANIDINE HCL 4 MG
2 TABLET ORAL DAILY
Refills: 0 | Status: DISCONTINUED | OUTPATIENT
Start: 2025-02-03 | End: 2025-02-04

## 2025-02-03 RX ORDER — TAMSULOSIN HYDROCHLORIDE 0.4 MG/1
0.4 CAPSULE ORAL AT BEDTIME
Refills: 0 | Status: DISCONTINUED | OUTPATIENT
Start: 2025-02-03 | End: 2025-02-04

## 2025-02-03 RX ORDER — METOPROLOL SUCCINATE 25 MG
50 TABLET, EXTENDED RELEASE 24 HR ORAL DAILY
Refills: 0 | Status: DISCONTINUED | OUTPATIENT
Start: 2025-02-03 | End: 2025-02-04

## 2025-02-03 RX ORDER — FAMOTIDINE 10 MG/ML
20 INJECTION INTRAVENOUS DAILY
Refills: 0 | Status: DISCONTINUED | OUTPATIENT
Start: 2025-02-03 | End: 2025-02-04

## 2025-02-03 RX ORDER — ATORVASTATIN CALCIUM 80 MG/1
40 TABLET, FILM COATED ORAL AT BEDTIME
Refills: 0 | Status: DISCONTINUED | OUTPATIENT
Start: 2025-02-03 | End: 2025-02-04

## 2025-02-03 RX ORDER — ASPIRIN 81 MG/1
81 TABLET, COATED ORAL DAILY
Refills: 0 | Status: DISCONTINUED | OUTPATIENT
Start: 2025-02-04 | End: 2025-02-04

## 2025-02-03 RX ORDER — BACTERIOSTATIC SODIUM CHLORIDE 0.9 %
500 VIAL (ML) INJECTION
Refills: 0 | Status: DISCONTINUED | OUTPATIENT
Start: 2025-02-03 | End: 2025-02-04

## 2025-02-03 RX ORDER — HEPARIN SODIUM,PORCINE 10000/ML
5000 VIAL (ML) INJECTION EVERY 12 HOURS
Refills: 0 | Status: DISCONTINUED | OUTPATIENT
Start: 2025-02-04 | End: 2025-02-04

## 2025-02-03 RX ADMIN — ATORVASTATIN CALCIUM 40 MILLIGRAM(S): 80 TABLET, FILM COATED ORAL at 21:34

## 2025-02-03 RX ADMIN — Medication 3 MILLILITER(S): at 15:45

## 2025-02-03 RX ADMIN — Medication 3 MILLILITER(S): at 21:36

## 2025-02-03 RX ADMIN — Medication 75 MILLILITER(S): at 21:31

## 2025-02-03 RX ADMIN — Medication 75 MILLILITER(S): at 15:45

## 2025-02-03 RX ADMIN — TAMSULOSIN HYDROCHLORIDE 0.4 MILLIGRAM(S): 0.4 CAPSULE ORAL at 21:33

## 2025-02-03 NOTE — H&P CARDIOLOGY - HISTORY OF PRESENT ILLNESS
78 y/o M w/ PMH of HTN, HLD and BPH presents for cardiac catheretization.    CT coronary done with extensive calcification so contrast study was cancelled as extent of calcification would make study not intrepretable. Coronary angiogram will be needed to definitevely evaluate the severity of CAD given inconclusive results.    Referring MD: Dr. Raya Kelley 78 y/o M w/ PMH of HTN, HLD and BPH presents for cardiac catheretization. Pt complains of exertional left sided non-radiating chest pain and dyspnea on exertion for the past 6 months. Pt mostly notices his symptoms when he wakes of up at night and walks to the bathroom. CT coronary done last month with extensive calcification so contrast study was cancelled as extent of calcification would make study not interpretable. Coronary angiogram will be needed to definitively evaluate the severity of CAD given inconclusive results. Pt denies N/V/D, fevers, chills, cough, palpitations, syncope, orthopnea, nocturnal paroxysmal dyspnea, edema, cyanosis, heart murmurs, varicosities, phlebitis, claudication.    Referring MD: Dr. Raya Kelley

## 2025-02-03 NOTE — H&P CARDIOLOGY - MS EXT PE MLT D E PC
[Evaluation] : evaluation of [FreeTextEntry1] : "I don't feel motivated." no clubbing/no cyanosis/no pedal edema

## 2025-02-03 NOTE — PATIENT PROFILE ADULT - FALL HARM RISK - PATIENT NEEDS ASSISTANCE
Ankit is a 5 y/o previously healthy male p/w cough, fever, and decreased PO intake, admitted for RLL pneumonia with right-sided pleural effusion.    #Pneumonia  - S/p rocephin x1 and ampicillin for 2 days  - Switched to PO Amoxicillin, continue for 4 more days for a total of 7 days therapy    Dispo:  - F/u PCP Dr. Barb Del Valle   No assistance needed

## 2025-02-03 NOTE — H&P CARDIOLOGY - COMMENTS
Pre Procedural Sedation Evaluation    Urine pregnancy: N/A  Dentures: None  Last PO intake: NPO p MN on 2/2  Obstructive sleep apnea: No  Aspiration risk: No  Mallampati score: 2  ASA Classification: 2  Prior Sedative or Anesthesia Experience: No complications  Informed consent by responsible adult: Yes  Responsible adult escort: Yes  Based on today's assessment, anesthesia consult requested: No

## 2025-02-03 NOTE — ASU PATIENT PROFILE, ADULT - ABILITY TO HEAR (WITH HEARING AID OR HEARING APPLIANCE IF NORMALLY USED):
hearing aids left at home/Mildly to Moderately Impaired: difficulty hearing in some environments or speaker may need to increase volume or speak distinctly

## 2025-02-03 NOTE — PATIENT PROFILE ADULT - VISION (WITH CORRECTIVE LENSES IF THE PATIENT USUALLY WEARS THEM):
4 = No assist / stand by assistance Normal vision: sees adequately in most situations; can see medication labels, newsprint

## 2025-02-03 NOTE — H&P CARDIOLOGY - NSICDXPASTMEDICALHX_GEN_ALL_CORE_FT
PAST MEDICAL HISTORY:  BPH (benign prostatic hyperplasia)     GERD (gastroesophageal reflux disease)     High cholesterol     HTN (hypertension)

## 2025-02-04 ENCOUNTER — TRANSCRIPTION ENCOUNTER (OUTPATIENT)
Age: 80
End: 2025-02-04

## 2025-02-04 VITALS
DIASTOLIC BLOOD PRESSURE: 77 MMHG | RESPIRATION RATE: 18 BRPM | TEMPERATURE: 97 F | SYSTOLIC BLOOD PRESSURE: 145 MMHG | OXYGEN SATURATION: 99 % | HEART RATE: 100 BPM

## 2025-02-04 LAB
ANION GAP SERPL CALC-SCNC: 12 MMOL/L — SIGNIFICANT CHANGE UP (ref 7–14)
BUN SERPL-MCNC: 17 MG/DL — SIGNIFICANT CHANGE UP (ref 7–23)
CALCIUM SERPL-MCNC: 8.9 MG/DL — SIGNIFICANT CHANGE UP (ref 8.4–10.5)
CHLORIDE SERPL-SCNC: 106 MMOL/L — SIGNIFICANT CHANGE UP (ref 98–107)
CO2 SERPL-SCNC: 21 MMOL/L — LOW (ref 22–31)
CREAT SERPL-MCNC: 0.73 MG/DL — SIGNIFICANT CHANGE UP (ref 0.5–1.3)
EGFR: 94 ML/MIN/1.73M2 — SIGNIFICANT CHANGE UP
GLUCOSE SERPL-MCNC: 87 MG/DL — SIGNIFICANT CHANGE UP (ref 70–99)
HCT VFR BLD CALC: 38.7 % — LOW (ref 39–50)
HGB BLD-MCNC: 12.7 G/DL — LOW (ref 13–17)
MAGNESIUM SERPL-MCNC: 2 MG/DL — SIGNIFICANT CHANGE UP (ref 1.6–2.6)
MCHC RBC-ENTMCNC: 29.3 PG — SIGNIFICANT CHANGE UP (ref 27–34)
MCHC RBC-ENTMCNC: 32.8 G/DL — SIGNIFICANT CHANGE UP (ref 32–36)
MCV RBC AUTO: 89.2 FL — SIGNIFICANT CHANGE UP (ref 80–100)
NRBC # BLD AUTO: 0 K/UL — SIGNIFICANT CHANGE UP (ref 0–0)
NRBC # BLD: 0 /100 WBCS — SIGNIFICANT CHANGE UP (ref 0–0)
NRBC # FLD: 0 K/UL — SIGNIFICANT CHANGE UP (ref 0–0)
NRBC BLD-RTO: 0 /100 WBCS — SIGNIFICANT CHANGE UP (ref 0–0)
PHOSPHATE SERPL-MCNC: 3.1 MG/DL — SIGNIFICANT CHANGE UP (ref 2.5–4.5)
PLATELET # BLD AUTO: 217 K/UL — SIGNIFICANT CHANGE UP (ref 150–400)
POTASSIUM SERPL-MCNC: 3.9 MMOL/L — SIGNIFICANT CHANGE UP (ref 3.5–5.3)
POTASSIUM SERPL-SCNC: 3.9 MMOL/L — SIGNIFICANT CHANGE UP (ref 3.5–5.3)
RBC # BLD: 4.34 M/UL — SIGNIFICANT CHANGE UP (ref 4.2–5.8)
RBC # FLD: 13.2 % — SIGNIFICANT CHANGE UP (ref 10.3–14.5)
SODIUM SERPL-SCNC: 139 MMOL/L — SIGNIFICANT CHANGE UP (ref 135–145)
WBC # BLD: 7.22 K/UL — SIGNIFICANT CHANGE UP (ref 3.8–10.5)
WBC # FLD AUTO: 7.22 K/UL — SIGNIFICANT CHANGE UP (ref 3.8–10.5)

## 2025-02-04 RX ADMIN — Medication 50 MILLIGRAM(S): at 05:29

## 2025-02-04 RX ADMIN — FAMOTIDINE 20 MILLIGRAM(S): 10 INJECTION INTRAVENOUS at 12:11

## 2025-02-04 RX ADMIN — ASPIRIN 81 MILLIGRAM(S): 81 TABLET, COATED ORAL at 12:11

## 2025-02-04 RX ADMIN — Medication 2 MILLIGRAM(S): at 12:11

## 2025-02-04 RX ADMIN — Medication 5000 UNIT(S): at 05:29

## 2025-02-04 RX ADMIN — Medication 3 MILLILITER(S): at 05:24

## 2025-02-04 RX ADMIN — Medication 75 MILLIGRAM(S): at 12:11

## 2025-02-04 NOTE — DISCHARGE NOTE PROVIDER - CARE PROVIDERS DIRECT ADDRESSES
[email or web address]

## 2025-02-04 NOTE — DISCHARGE NOTE PROVIDER - NSDCFUADDAPPT_GEN_ALL_CORE_FT
Follow up with your primary care provider in 1-2 weeks of discharge.    Follow up with Dr. Kelley, cardiology, within 1 week of discharge for staged PCI to OM1 next week as discussed with cardiology.

## 2025-02-04 NOTE — DISCHARGE NOTE PROVIDER - HOSPITAL COURSE
79M PMH of HTN, HLD and BPH presents for cardiac catheterization i/s/o class III anginal symptoms and recent abnormal CTA coronaries with extensive calcification.    Pt was admitted for LHC. S/p LHC 2/3 w/ OM1 80%, RCA 90% s/p DEX x1; was planned for staged PCI to OM1 2/4, however, pt declined LHC 2/4 and per discussion with cardiology, will be scheduled to return for repeat LHC for PCI to OM1 next week. Continue ASA, plavix, simvastatin, and metoprolol on discharge.    Case discussed with Dr. Kelley on 2/4. Patient is medically stable and optimized for discharge home as per attending. All medications were reviewed and prescriptions were sent to a mutually agreed upon pharmacy. Discharge plan reviewed with patient and family.

## 2025-02-04 NOTE — PROGRESS NOTE ADULT - ASSESSMENT
80 y/o M w/ PMH of HTN, HLD and BPH presents for cardiac catheretization in the setting of class III anginal symptoms and recent abnormal CTA Coronaries with extensive calcification so contrast study was cancelled as extent of calcification would make study not interpretable.  Pt underwent s/p LHC 2/3:  ESA RCA (90%), residual OM1 80%.  Tentative plan for outpatient staging OM1 lesion next week with Dr Kelley    # Multivessel CAD    - HDS, VSS  - EKG: SR, no acute ischemic changes  - Access site stable w/o hematoma or bleed  - continue ASA 81mg daily, Plavix 75mg daily for DAPT for 1 year (please send script for DAPT, 90 day supply)  - continue home Simvastatin 40mg daily  - continue Metoprolol Succinate 50mg daily  - Monitor electrolytes and replete K to 4 and Mg to 2   - Educated patient on importance of compliance on antiplatelet therapy.  Additionally, discharge medications, access site restrictions, dietary changes/exercise discussed in-depth with patient who endorses understanding of the information and is agreement with plan. All pertinent questions answered  - Case discussed with interventional cardiologist, Dr Kelley, deemed patient stable for discharge today from cardiology standpoint.   Plan for outpatient staging of OM1 lesion next week with Dr Kelley     78 y/o M w/ PMH of HTN, HLD and BPH presents for cardiac catheretization in the setting of class III anginal symptoms and recent abnormal CTA Coronaries with extensive calcification so contrast study was cancelled as extent of calcification would make study not interpretable.  Pt underwent s/p LHC 2/3:  ESA RCA (90%), residual OM1 80%.  Tentative plan for outpatient staging OM1 lesion next week with Dr Kelley    # Multivessel CAD    - HDS, VSS  - EKG: SR, no acute ischemic changes  - Access site stable w/o hematoma or bleed  - continue ASA 81mg daily, Plavix 75mg daily for DAPT for 1 year (please send script for DAPT, 90 day supply)  - continue home Simvastatin 40mg daily  - continue Metoprolol Succinate 50mg daily  - Monitor electrolytes and replete K to 4 and Mg to 2   - Educated patient on importance of compliance on antiplatelet therapy.  Additionally, discharge medications, access site restrictions, dietary changes/exercise discussed in-depth with patient who endorses understanding of the information and is agreement with plan. All pertinent questions answered  - Case discussed with interventional cardiologist, Dr Kelley, deemed patient stable for discharge today from cardiology standpoint.   Plan for outpatient staging of OM1 lesion next week with Dr Kelley    **NO CARDIAC REHAB SCRIPT GIVEN ON THIS DISCHARGE:  PATIENT IS RETURNING FOR STAGED PCI***

## 2025-02-04 NOTE — DISCHARGE NOTE PROVIDER - NSDCMRMEDTOKEN_GEN_ALL_CORE_FT
aspirin 81 mg oral delayed release tablet: 1 tab(s) orally once a day  clopidogrel 75 mg oral tablet: 1 tab(s) orally once a day  famotidine 20 mg oral tablet: 1 tab(s) orally once a day  loratadine 10 mg oral tablet: 1 tab(s) orally once a day  meloxicam 15 mg oral tablet: 1 tab(s) orally once a day as needed for  moderate pain  metoprolol succinate 50 mg oral tablet, extended release: 1 tab(s) orally once a day  Myrbetriq 25 mg oral tablet, extended release: 1 tab(s) orally once a day  simvastatin 40 mg oral tablet: 1 tab(s) orally once a day (at bedtime)  tamsulosin 0.4 mg oral capsule: 1 cap(s) orally once a day  tiZANidine 2 mg oral tablet: 1 tab(s) orally once a day  Vitamin D3 25 mcg (1000 intl units) oral tablet: 1 tab(s) orally once a day

## 2025-02-04 NOTE — DISCHARGE NOTE PROVIDER - NSDCCPCAREPLAN_GEN_ALL_CORE_FT
PRINCIPAL DISCHARGE DIAGNOSIS  Diagnosis: CAD (coronary artery disease)  Assessment and Plan of Treatment: You came to the hospital for cardiac catheterization which was performed on 2/3. You had a stent placed in the right coronary artery. It was recommended that you have a staged PCI on 2/4 with stent placement in OM1, however, you discussed with cardiology and decided to come back for PCI next week.  Continue medications as prescribed. Follow up with your primary care provider in 1-2 weeks of discharge. Follow up with Dr. Kelley within 1 week for repeat cardiac catheterization as discussed.

## 2025-02-04 NOTE — DISCHARGE NOTE NURSING/CASE MANAGEMENT/SOCIAL WORK - FINANCIAL ASSISTANCE
[facility] provides services at a reduced cost to those who are determined to be eligible through [facility]’s financial assistance program. Information regarding [facility]’s financial assistance program can be found by going to [email or web address] or by calling [phone].

## 2025-02-04 NOTE — DISCHARGE NOTE PROVIDER - NSDCACTIVITY_GEN_ALL_CORE
avoid lifting >5lbs for the next 5 days per cardiology recommendations/No heavy lifting/straining/Activity as tolerated

## 2025-02-04 NOTE — DISCHARGE NOTE NURSING/CASE MANAGEMENT/SOCIAL WORK - PATIENT PORTAL LINK FT
You can access the FollowMyHealth Patient Portal offered by [facility] by registering at the following website: [email or web address]. By joining [employer]’s FollowMyHealth portal, you will also be able to view your health information using other applications (apps) compatible with our system.

## 2025-02-04 NOTE — DISCHARGE NOTE PROVIDER - CARE PROVIDER_API CALL
Reba Forrest.  Internal Medicine  [address] [place] [address]  Phone: [phone]  Fax: [phone]  Follow Up Time:     Raya Kelley)  Interventional Cardiology  [address] [place] [address], [address] [place] [address]  Phone: [phone]  Fax: [phone]  Follow Up Time:

## 2025-02-04 NOTE — PROGRESS NOTE ADULT - SUBJECTIVE AND OBJECTIVE BOX
Incomplete      Patient seen and examined at bedside. HDS.  Pt denies CP, SOB, palpitations, diaphoresis, dizziness, Syncope, LE swelling, acute bleed or any other complaints at this time  - Pt is s/p Cardiac Angiogram 2/3 with ESA RCA, residual OM1 80%, tentative plan for staging next week with Dr Kelley   - no events on telemetry overnight, remains in sinus rhythm      PERTINENT REVIEW OF SYSTEMS:  Constitutional:     [ ] negative [ ] fevers [ ] chills [ ] weight loss [ ] weight gain  CV:                         [ ] negative  [ ] chest pain [ ] orthopnea [ ] palpitations [ ] murmur  Resp:                     [ ] negative [ ] cough [ ] shortness of breath [ ] dyspnea [ ] wheezing [ ] sputum [ ]hemoptysis  GI:                          [ ] negative [ ] nausea [ ] vomiting [ ] diarrhea [ ] constipation [ ] abd pain [ ] dysphagia   Skin:                       [ ] negative [ ] rash [ ] itch  Neurological:        [ ] negative [ ] headache [ ] dizziness [ ] syncope [ ] weakness [ ] numbness  Psychiatric:           [ ] negative [ ] anxiety [ ] depression  Endocrine:            [ ] negative [ ] diabetes [ ] thyroid problem  Heme/Lymph:      [ ] negative [ ] anemia [ ] bleeding problem  Allergic/Immune: [ ] negative [ ] itchy eyes [ ] nasal discharge [ ] hives [ ] angioedema    [x] All other systems negative  [ ] Unable to assess ROS due to    Home Medications  Home Medications:  aspirin 81 mg oral delayed release tablet: 1 tab(s) orally once a day (2025 12:16)  famotidine 20 mg oral tablet: 1 tab(s) orally once a day (2025 20:33)  loratadine 10 mg oral tablet: 1 tab(s) orally once a day (2025 20:33)  meloxicam 15 mg oral tablet: 1 tab(s) orally once a day as needed for  moderate pain (2025 20:33)  metoprolol succinate 50 mg oral tablet, extended release: 1 tab(s) orally once a day (2025 20:33)  Myrbetriq 25 mg oral tablet, extended release: 1 tab(s) orally once a day (2025 20:33)  simvastatin 40 mg oral tablet: 1 tab(s) orally once a day (at bedtime) (2025 12:16)  tamsulosin 0.4 mg oral capsule: 1 cap(s) orally once a day (2025 12:16)  tiZANidine 2 mg oral tablet: 1 tab(s) orally once a day (2025 20:33)  Vitamin D3 25 mcg (1000 intl units) oral tablet: 1 tab(s) orally once a day (2025 20:33)      Current Meds:  aspirin enteric coated 81 milliGRAM(s) Oral daily  atorvastatin 40 milliGRAM(s) Oral at bedtime  clopidogrel Tablet 75 milliGRAM(s) Oral daily  famotidine    Tablet 20 milliGRAM(s) Oral daily  heparin   Injectable 5000 Unit(s) SubCutaneous every 12 hours  metoprolol succinate ER 50 milliGRAM(s) Oral daily  sodium chloride 0.9% lock flush 3 milliLiter(s) IV Push every 8 hours  sodium chloride 0.9%. 500 milliLiter(s) IV Continuous <Continuous>  tamsulosin 0.4 milliGRAM(s) Oral at bedtime  tiZANidine 2 milliGRAM(s) Oral daily      PAST MEDICAL & SURGICAL HISTORY:  HTN (hypertension)      High cholesterol      GERD (gastroesophageal reflux disease)      BPH (benign prostatic hyperplasia)    Vitals:  T(F): 98.2 (-), Max: 98.6 (-)  HR: 80 () (74 - 86)  BP: 111/59 (-) (102/70 - 145/79)  RR: 18 (-)  SpO2: 98% (-)  I&O's Summary    2025 07:01  -  2025 07:00  --------------------------------------------------------  IN: 1020 mL / OUT: 0 mL / NET: 1020 mL    Physical Exam:  Appearance: No acute distress; well appearing  Neck: Supple, no JVD B/L, no Carotid Bruit B/L  Cardiovascular: RRR, S1, S2, no murmurs, rubs, or gallops, no edema  Respiratory: Clear to auscultation bilaterally, no RRW B/L  Gastrointestinal: soft, non-tender, non-distended with normal bowel sounds  Neurologic: No focal or neuro deficits noted  Psychiatry: AAOx3, mood & affect appropriate  Extremities:   No LE swelling bilaterally, palpable pulses throughout  Access Site: RRA.  Stable, C/D/I, w/o hematoma or bleeding, pulses intact, back to baseline                          13.9   5.94  )-----------( 255      ( 2025 11:40 )             45.3         144  |  105  |  14  ----------------------------<  107[H]  4.0   |  26  |  0.83    Ca    9.9      2025 11:40      Cardiac Imaging    < from: Cardiac Catheterization (25 @ 13:49) >      Study Date:     2025   Name:           SHEILA TREVIZO   :            1945   (79 years)   Gender:         male   MR#:            [id number]   MPI#:           [id number]   Patient Class:  Inpatient     Cath Lab Report    Diagnostic Cardiologist:       Raya Kelley MD   Interventional Cardiologist:   Raya Kelley MD   Fellow:                        Ritesh Mansfield   Referring Physician:           Raya Kelley MD   Referring Physician:           Apple Weston DO     Procedures Performed   Procedures:              1.    Arterial Access - Right Radial     2.    Diagnostic Coronary Angiography   3.    Left Heart Cath   4.    PCI: ESA   5.    IVUS     Indications:               Unstable angina   CCS Class III   ACS greaterthan 24 hours     PCI Status:               elective     Conclusions:   Successful PCI to severe stenosis of mid RCA using Xience ESA     IVUS was used for PCI optimization   Severe stenosis OM1   Recommendations:     DAPT X 12 months   Aggressive risk factor modification   Staged PCI to OM1      Presentation:   Recent clinic visit patient reported new onset angina. Cardiac CT  shows extensive calcification and CTA was  cancelled due to calfications.      ------------------------------------------------------------------------------------------------------           Patient seen and examined at bedside. HDS.  Pt denies CP, SOB, palpitations, diaphoresis, dizziness, Syncope, LE swelling, acute bleed or any other complaints at this time  - Pt is s/p Cardiac Angiogram 2/3 with ESA RCA, residual OM1 80%, tentative plan for staging next week with Dr Kelley   - no events on telemetry overnight, remains in sinus rhythm      PERTINENT REVIEW OF SYSTEMS:  Constitutional:     [ ] negative [ ] fevers [ ] chills [ ] weight loss [ ] weight gain  CV:                         [ ] negative  [ ] chest pain [ ] orthopnea [ ] palpitations [ ] murmur  Resp:                     [ ] negative [ ] cough [ ] shortness of breath [ ] dyspnea [ ] wheezing [ ] sputum [ ]hemoptysis  GI:                          [ ] negative [ ] nausea [ ] vomiting [ ] diarrhea [ ] constipation [ ] abd pain [ ] dysphagia   Skin:                       [ ] negative [ ] rash [ ] itch  Neurological:        [ ] negative [ ] headache [ ] dizziness [ ] syncope [ ] weakness [ ] numbness  Psychiatric:           [ ] negative [ ] anxiety [ ] depression  Endocrine:            [ ] negative [ ] diabetes [ ] thyroid problem  Heme/Lymph:      [ ] negative [ ] anemia [ ] bleeding problem  Allergic/Immune: [ ] negative [ ] itchy eyes [ ] nasal discharge [ ] hives [ ] angioedema    [x] All other systems negative  [ ] Unable to assess ROS due to    Home Medications  Home Medications:  aspirin 81 mg oral delayed release tablet: 1 tab(s) orally once a day (2025 12:16)  famotidine 20 mg oral tablet: 1 tab(s) orally once a day (2025 20:33)  loratadine 10 mg oral tablet: 1 tab(s) orally once a day (2025 20:33)  meloxicam 15 mg oral tablet: 1 tab(s) orally once a day as needed for  moderate pain (2025 20:33)  metoprolol succinate 50 mg oral tablet, extended release: 1 tab(s) orally once a day (2025 20:33)  Myrbetriq 25 mg oral tablet, extended release: 1 tab(s) orally once a day (2025 20:33)  simvastatin 40 mg oral tablet: 1 tab(s) orally once a day (at bedtime) (2025 12:16)  tamsulosin 0.4 mg oral capsule: 1 cap(s) orally once a day (2025 12:16)  tiZANidine 2 mg oral tablet: 1 tab(s) orally once a day (2025 20:33)  Vitamin D3 25 mcg (1000 intl units) oral tablet: 1 tab(s) orally once a day (2025 20:33)    Current Meds:  aspirin enteric coated 81 milliGRAM(s) Oral daily  atorvastatin 40 milliGRAM(s) Oral at bedtime  clopidogrel Tablet 75 milliGRAM(s) Oral daily  famotidine    Tablet 20 milliGRAM(s) Oral daily  heparin   Injectable 5000 Unit(s) SubCutaneous every 12 hours  metoprolol succinate ER 50 milliGRAM(s) Oral daily  sodium chloride 0.9% lock flush 3 milliLiter(s) IV Push every 8 hours  sodium chloride 0.9%. 500 milliLiter(s) IV Continuous <Continuous>  tamsulosin 0.4 milliGRAM(s) Oral at bedtime  tiZANidine 2 milliGRAM(s) Oral daily      PAST MEDICAL & SURGICAL HISTORY:  HTN (hypertension)      High cholesterol      GERD (gastroesophageal reflux disease)      BPH (benign prostatic hyperplasia)    Vitals:  T(F): 98.2 (-), Max: 98.6 (-)  HR: 80 () (74 - 86)  BP: 111/59 (-) (102/70 - 145/79)  RR: 18 (-)  SpO2: 98% (-)  I&O's Summary    2025 07:01  -  2025 07:00  --------------------------------------------------------  IN: 1020 mL / OUT: 0 mL / NET: 1020 mL    Physical Exam:  Appearance: No acute distress; well appearing  Neck: Supple, no JVD B/L, no Carotid Bruit B/L  Cardiovascular: RRR, S1, S2, no murmurs, rubs, or gallops, no edema  Respiratory: Clear to auscultation bilaterally, no RRW B/L  Gastrointestinal: soft, non-tender, non-distended with normal bowel sounds  Neurologic: No focal or neuro deficits noted  Psychiatry: AAOx3, mood & affect appropriate  Extremities:   No LE swelling bilaterally, palpable pulses throughout  Access Site: RRA.  Stable, C/D/I, w/o hematoma or bleeding, pulses intact, back to baseline                          13.9   5.94  )-----------( 255      ( 2025 11:40 )             45.3         144  |  105  |  14  ----------------------------<  107[H]  4.0   |  26  |  0.83    Ca    9.9      2025 11:40      Cardiac Imaging    < from: Cardiac Catheterization (25 @ 13:49) >      Study Date:     2025   Name:           SHEILA TREVIZO   :            1945   (79 years)   Gender:         male   MR#:            [id number]   MPI#:           [id number]   Patient Class:  Inpatient     Cath Lab Report    Diagnostic Cardiologist:       Raya Kelley MD   Interventional Cardiologist:   Raya Kelley MD   Fellow:                        Ritesh Mansfield   Referring Physician:           Raya Kelley MD   Referring Physician:           Apple Weston DO     Procedures Performed   Procedures:              1.    Arterial Access - Right Radial     2.    Diagnostic Coronary Angiography   3.    Left Heart Cath   4.    PCI: ESA   5.    IVUS     Indications:               Unstable angina   CCS Class III   ACS greaterthan 24 hours     PCI Status:               elective     Conclusions:   Successful PCI to severe stenosis of mid RCA using Xience ESA     IVUS was used for PCI optimization   Severe stenosis OM1   Recommendations:     DAPT X 12 months   Aggressive risk factor modification   Staged PCI to OM1      Presentation:   Recent clinic visit patient reported new onset angina. Cardiac CT  shows extensive calcification and CTA was  cancelled due to calcifications     ------------------------------------------------------------------------------------------------------

## 2025-02-10 ENCOUNTER — NON-APPOINTMENT (OUTPATIENT)
Age: 80
End: 2025-02-10

## 2025-02-10 PROBLEM — N40.0 BENIGN PROSTATIC HYPERPLASIA WITHOUT LOWER URINARY TRACT SYMPTOMS: Chronic | Status: ACTIVE | Noted: 2025-02-03

## 2025-02-11 DIAGNOSIS — Z78.9 OTHER SPECIFIED HEALTH STATUS: ICD-10-CM

## 2025-02-11 RX ORDER — METOPROLOL SUCCINATE 50 MG/1
50 TABLET, EXTENDED RELEASE ORAL
Qty: 90 | Refills: 3 | Status: ACTIVE | COMMUNITY

## 2025-02-11 RX ORDER — CLOPIDOGREL BISULFATE 75 MG/1
75 TABLET, FILM COATED ORAL DAILY
Qty: 90 | Refills: 3 | Status: ACTIVE | COMMUNITY

## 2025-02-11 RX ORDER — UBIDECARENONE/VIT E ACET 100MG-5
25 MCG CAPSULE ORAL DAILY
Refills: 0 | Status: ACTIVE | COMMUNITY

## 2025-02-13 NOTE — ASU PATIENT PROFILE, ADULT - MEDICATIONS BROUGHT TO HOSPITAL, PROFILE
Lorna Haro presents today for   Chief Complaint   Patient presents with    Medicare AWV           \"Have you been to the ER, urgent care clinic since your last visit?  Hospitalized since your last visit?\"    NO    “Have you seen or consulted any other health care providers outside of [facility] since your last visit?”    NO        “Have you had a pap smear?”    NO    No cervical cancer screening on file            no

## 2025-02-18 ENCOUNTER — APPOINTMENT (OUTPATIENT)
Dept: CARDIOLOGY | Facility: CLINIC | Age: 80
End: 2025-02-18

## 2025-02-24 ENCOUNTER — OUTPATIENT (OUTPATIENT)
Dept: OUTPATIENT SERVICES | Facility: HOSPITAL | Age: 80
LOS: 1 days | Discharge: HOME | End: 2025-02-24
Payer: MEDICARE

## 2025-02-24 ENCOUNTER — TRANSCRIPTION ENCOUNTER (OUTPATIENT)
Age: 80
End: 2025-02-24

## 2025-02-24 VITALS
HEART RATE: 84 BPM | OXYGEN SATURATION: 99 % | WEIGHT: 128.09 LBS | SYSTOLIC BLOOD PRESSURE: 148 MMHG | RESPIRATION RATE: 16 BRPM | DIASTOLIC BLOOD PRESSURE: 86 MMHG | TEMPERATURE: 98 F | HEIGHT: 62 IN

## 2025-02-24 VITALS
HEART RATE: 83 BPM | SYSTOLIC BLOOD PRESSURE: 106 MMHG | OXYGEN SATURATION: 99 % | RESPIRATION RATE: 18 BRPM | DIASTOLIC BLOOD PRESSURE: 69 MMHG

## 2025-02-24 DIAGNOSIS — I25.10 ATHEROSCLEROTIC HEART DISEASE OF NATIVE CORONARY ARTERY WITHOUT ANGINA PECTORIS: ICD-10-CM

## 2025-02-24 LAB
ANION GAP SERPL CALC-SCNC: 11 MMOL/L — SIGNIFICANT CHANGE UP (ref 7–14)
BUN SERPL-MCNC: 16 MG/DL — SIGNIFICANT CHANGE UP (ref 7–23)
CALCIUM SERPL-MCNC: 9.4 MG/DL — SIGNIFICANT CHANGE UP (ref 8.4–10.5)
CHLORIDE SERPL-SCNC: 107 MMOL/L — SIGNIFICANT CHANGE UP (ref 98–107)
CO2 SERPL-SCNC: 24 MMOL/L — SIGNIFICANT CHANGE UP (ref 22–31)
CREAT SERPL-MCNC: 0.74 MG/DL — SIGNIFICANT CHANGE UP (ref 0.5–1.3)
EGFR: 93 ML/MIN/1.73M2 — SIGNIFICANT CHANGE UP
GLUCOSE SERPL-MCNC: 104 MG/DL — HIGH (ref 70–99)
HCT VFR BLD CALC: 42.3 % — SIGNIFICANT CHANGE UP (ref 39–50)
HGB BLD-MCNC: 13.7 G/DL — SIGNIFICANT CHANGE UP (ref 13–17)
MCHC RBC-ENTMCNC: 29.1 PG — SIGNIFICANT CHANGE UP (ref 27–34)
MCHC RBC-ENTMCNC: 32.4 G/DL — SIGNIFICANT CHANGE UP (ref 32–36)
MCV RBC AUTO: 90 FL — SIGNIFICANT CHANGE UP (ref 80–100)
NRBC # BLD AUTO: 0 K/UL — SIGNIFICANT CHANGE UP (ref 0–0)
NRBC # FLD: 0 K/UL — SIGNIFICANT CHANGE UP (ref 0–0)
NRBC BLD AUTO-RTO: 0 /100 WBCS — SIGNIFICANT CHANGE UP (ref 0–0)
PLATELET # BLD AUTO: 278 K/UL — SIGNIFICANT CHANGE UP (ref 150–400)
POTASSIUM SERPL-MCNC: 4.8 MMOL/L — SIGNIFICANT CHANGE UP (ref 3.5–5.3)
POTASSIUM SERPL-SCNC: 4.8 MMOL/L — SIGNIFICANT CHANGE UP (ref 3.5–5.3)
RBC # BLD: 4.7 M/UL — SIGNIFICANT CHANGE UP (ref 4.2–5.8)
RBC # FLD: 13.3 % — SIGNIFICANT CHANGE UP (ref 10.3–14.5)
SODIUM SERPL-SCNC: 142 MMOL/L — SIGNIFICANT CHANGE UP (ref 135–145)
WBC # BLD: 5.26 K/UL — SIGNIFICANT CHANGE UP (ref 3.8–10.5)
WBC # FLD AUTO: 5.26 K/UL — SIGNIFICANT CHANGE UP (ref 3.8–10.5)

## 2025-02-24 PROCEDURE — 93010 ELECTROCARDIOGRAM REPORT: CPT | Mod: 76

## 2025-02-24 PROCEDURE — 92928 PRQ TCAT PLMT NTRAC ST 1 LES: CPT | Mod: LC

## 2025-02-24 PROCEDURE — 99152 MOD SED SAME PHYS/QHP 5/>YRS: CPT

## 2025-02-24 PROCEDURE — 93010 ELECTROCARDIOGRAM REPORT: CPT

## 2025-02-24 RX ORDER — TIZANIDINE HCL 4 MG
1 TABLET ORAL
Refills: 0 | DISCHARGE

## 2025-02-24 RX ORDER — METOPROLOL SUCCINATE 25 MG
1 TABLET, EXTENDED RELEASE 24 HR ORAL
Refills: 0 | DISCHARGE

## 2025-02-24 RX ORDER — MELOXICAM 7.5 MG
1 TABLET ORAL
Refills: 0 | DISCHARGE

## 2025-02-24 RX ORDER — FAMOTIDINE 10 MG/ML
1 INJECTION INTRAVENOUS
Refills: 0 | DISCHARGE

## 2025-02-24 RX ORDER — CLOPIDOGREL BISULFATE 75 MG/1
75 TABLET, FILM COATED ORAL ONCE
Refills: 0 | Status: COMPLETED | OUTPATIENT
Start: 2025-02-24 | End: 2025-02-24

## 2025-02-24 RX ORDER — ATORVASTATIN CALCIUM 80 MG/1
1 TABLET, FILM COATED ORAL
Refills: 0 | DISCHARGE

## 2025-02-24 RX ORDER — ASPIRIN 325 MG
81 TABLET ORAL ONCE
Refills: 0 | Status: COMPLETED | OUTPATIENT
Start: 2025-02-24 | End: 2025-02-24

## 2025-02-24 RX ORDER — MIRABEGRON 25 MG/1
1 TABLET, FILM COATED, EXTENDED RELEASE ORAL
Refills: 0 | DISCHARGE

## 2025-02-24 RX ADMIN — Medication 75 MILLILITER(S): at 10:46

## 2025-02-24 RX ADMIN — Medication 75 MILLILITER(S): at 18:25

## 2025-02-24 RX ADMIN — Medication 3 MILLILITER(S): at 14:26

## 2025-02-24 RX ADMIN — CLOPIDOGREL BISULFATE 75 MILLIGRAM(S): 75 TABLET, FILM COATED ORAL at 09:42

## 2025-02-24 RX ADMIN — Medication 81 MILLIGRAM(S): at 09:42

## 2025-02-24 NOTE — ASU DISCHARGE PLAN (ADULT/PEDIATRIC) - NS MD DC FALL RISK RISK
For information on Fall & Injury Prevention, visit: [email or web address] OR  [email or web address] OR  https://www.cdc.gov/steadi/patient.html

## 2025-02-24 NOTE — H&P CARDIOLOGY - HISTORY OF PRESENT ILLNESS
79 y.o. male presents today for elective cardiac catheterization 79 y.o. male presents today for elective cardiac catheterization      Cardiac cath 2/3/25  LM   Left main artery: Angiography shows mild atherosclerosis.      LAD   Left anterior descending artery: Angiography shows mild  atherosclerosis.    CX   Circumflex: OM-1 80%, SHADE-3 flow.      RCA   Mid right coronary artery: SHADE-2 flow. . There is a 99 % stenosis.  s/p ESA x 1.      79 y.o. male presents today for elective staged PCI to OM1.       Cardiac cath 2/3/25  LM   Left main artery: Angiography shows mild atherosclerosis.      LAD   Left anterior descending artery: Angiography shows mild  atherosclerosis.    CX   Circumflex: OM-1 80%, SHADE-3 flow.      RCA   Mid right coronary artery: SHADE-2 flow. . There is a 99 % stenosis.  s/p ESA x 1.      79 y.o. male presents today for elective staged PCI to OM1. The patient denies chest pain, SOB, palpitations, dizziness, presyncope, syncope,  headache, visual disturbances, CVA, PE, DVT, ILA, abdominal pain, N/V/D/C, hematochezia, melena, dysuria, hematuria, fever, chills.      Referring physician, Dr. Kelley      Cardiac cath 2/3/25  LM   Left main artery: Angiography shows mild atherosclerosis.      LAD   Left anterior descending artery: Angiography shows mild  atherosclerosis.    CX   Circumflex: OM-1 80%, SHADE-3 flow.      RCA   Mid right coronary artery: SHADE-2 flow. . There is a 99 % stenosis.  s/p ESA x 1.

## 2025-02-24 NOTE — ASU DISCHARGE PLAN (ADULT/PEDIATRIC) - CARE PROVIDER_API CALL
Raya Kelley)  Interventional Cardiology  [address] [place] [address], [address]  [place] [address]  Phone: [phone]  Fax: [phone]  Follow Up Time:

## 2025-02-24 NOTE — ASU DISCHARGE PLAN (ADULT/PEDIATRIC) - FINANCIAL ASSISTANCE
[facility] provides services at a reduced cost to those who are determined to be eligible through [facility]’s financial assistance program. Information regarding [facility]’s financial assistance program can be found by going to [email or web address] or by calling [phone].

## 2025-02-24 NOTE — ASU DISCHARGE PLAN (ADULT/PEDIATRIC) - ASU DC SPECIAL INSTRUCTIONSFT
Intact
Your procedure was performed through the  [facility]  For the next 3 days:  - Avoid pushing and pulling with the affected wrist.   - Avoid repeated movement of the affected hand and wrist (typing, hammering).  - Do not lift anything more than 5 pounds.    MEDICATION:   - Take your medications as explained (see attached medication reconciliation on discharge paperwork).  - If you had a stent placed, you will be taking antiplatelet medications to keep your stent open (examples: Aspirin, Plavix, Brilinta, Effient). Take your medications as prescribed. Do not stop taking them without consulting with your cardiologist/vascular surgeon first.    ADDITIONAL INSTRUCTIONS:  - Follow a heart healthy diet recommended by your doctor.   - If you smoke, we encourage smoking cessation. You may call [phone] for center of tobacco control if you need assistance.  - Call your doctor to make appointment within 2 weeks.    ***CALL YOUR DOCTOR***  - If you experience fever, chills, body aches, or severe pain, swelling, redness, heat, or yellow discharge from your incision site.  - If you notice bleeding or significant swelling at the procedural site.  - If you experience chest pain.  - If you experience extremity numbness, tingling, or temperature change.    If you are unable to get in contact with your doctor, you may contact the Interventional Recovery Suite at [phone].  Please reference your discharge instructions for any questions or concerns.

## 2025-02-24 NOTE — H&P CARDIOLOGY - COMMENTS
Pre-sedation evaluation    Dentures:   Last PO intake:    Level of consciousness: 1  Obstructive sleep apnea: No  Aspiration risk: No  Mallampati score: 2  ASA Classification: 2  Prior Sedative or Anesthesia Experience: No complications  Informed consent by responsible adult: Yes  Responsible adult escort: Yes  Based on today's assessment, anesthesia consult requested: No Pre-sedation evaluation    Dentures: none  Last PO intake: 2/23/25 22:30    Level of consciousness: 1  Obstructive sleep apnea: No  Aspiration risk: No  Mallampati score: 2  ASA Classification: 2  Prior Sedative or Anesthesia Experience: No complications  Informed consent by responsible adult: Yes  Responsible adult escort: Yes  Based on today's assessment, anesthesia consult requested: No

## 2025-02-24 NOTE — H&P CARDIOLOGY - NSICDXPASTMEDICALHX_GEN_ALL_CORE_FT
PAST MEDICAL HISTORY:  BPH (benign prostatic hyperplasia)     GERD (gastroesophageal reflux disease)     High cholesterol     HTN (hypertension)      PAST MEDICAL HISTORY:  BPH (benign prostatic hyperplasia)     CAD (coronary artery disease)     GERD (gastroesophageal reflux disease)     High cholesterol     HTN (hypertension)     Prostate cancer     Stented coronary artery

## 2025-02-24 NOTE — CHART NOTE - NSCHARTNOTEFT_GEN_A_CORE
Cardiac Rehabilitation Referral Post PCI:       - Education on benefits of cardiac rehab provided to patient: Yes  - Referral and prescription given for cardiac rehab: Yes  - Patient given a list of locations and instructed to contact their insurance company to review list of participating providers: Yes  - Patient instructed to bring cardiac rehab prescription with them to cardiology follow up appointment for assistance with enrollment: Yes  - Patient discharged with copies detailing cardiovascular history, medications, testing/treatments: Yes

## 2025-02-26 PROBLEM — Z95.5 PRESENCE OF CORONARY ANGIOPLASTY IMPLANT AND GRAFT: Chronic | Status: ACTIVE | Noted: 2025-02-24

## 2025-02-26 PROBLEM — I25.10 ATHEROSCLEROTIC HEART DISEASE OF NATIVE CORONARY ARTERY WITHOUT ANGINA PECTORIS: Chronic | Status: ACTIVE | Noted: 2025-02-24

## 2025-02-26 PROBLEM — C61 MALIGNANT NEOPLASM OF PROSTATE: Chronic | Status: ACTIVE | Noted: 2025-02-24

## 2025-03-03 DIAGNOSIS — K21.9 GASTRO-ESOPHAGEAL REFLUX DISEASE W/OUT ESOPHAGITIS: ICD-10-CM

## 2025-03-03 DIAGNOSIS — C61 MALIGNANT NEOPLASM OF PROSTATE: ICD-10-CM

## 2025-03-03 DIAGNOSIS — Z87.438 PERSONAL HISTORY OF OTHER DISEASES OF MALE GENITAL ORGANS: ICD-10-CM

## 2025-03-03 DIAGNOSIS — I25.10 ATHEROSCLEROTIC HEART DISEASE OF NATIVE CORONARY ARTERY W/OUT ANGINA PECTORIS: ICD-10-CM

## 2025-03-03 DIAGNOSIS — E78.5 HYPERLIPIDEMIA, UNSPECIFIED: ICD-10-CM

## 2025-03-03 RX ORDER — TIZANIDINE 2 MG/1
2 TABLET ORAL DAILY
Refills: 0 | Status: ACTIVE | COMMUNITY

## 2025-03-03 RX ORDER — LORATADINE 10 MG/1
10 TABLET ORAL DAILY
Refills: 0 | Status: ACTIVE | COMMUNITY

## 2025-03-05 ENCOUNTER — NON-APPOINTMENT (OUTPATIENT)
Age: 80
End: 2025-03-05

## 2025-03-05 ENCOUNTER — APPOINTMENT (OUTPATIENT)
Dept: CARDIOLOGY | Facility: CLINIC | Age: 80
End: 2025-03-05
Payer: MEDICARE

## 2025-03-05 VITALS
WEIGHT: 128 LBS | HEART RATE: 77 BPM | SYSTOLIC BLOOD PRESSURE: 131 MMHG | TEMPERATURE: 97.8 F | BODY MASS INDEX: 22.67 KG/M2 | DIASTOLIC BLOOD PRESSURE: 75 MMHG | OXYGEN SATURATION: 98 %

## 2025-03-05 DIAGNOSIS — I25.10 ATHEROSCLEROTIC HEART DISEASE OF NATIVE CORONARY ARTERY W/OUT ANGINA PECTORIS: ICD-10-CM

## 2025-03-05 DIAGNOSIS — E78.5 HYPERLIPIDEMIA, UNSPECIFIED: ICD-10-CM

## 2025-03-05 PROCEDURE — 99214 OFFICE O/P EST MOD 30 MIN: CPT

## 2025-03-05 PROCEDURE — 93000 ELECTROCARDIOGRAM COMPLETE: CPT

## 2025-03-28 ENCOUNTER — NON-APPOINTMENT (OUTPATIENT)
Age: 80
End: 2025-03-28

## 2025-06-06 ENCOUNTER — APPOINTMENT (OUTPATIENT)
Dept: ORTHOPEDIC SURGERY | Facility: CLINIC | Age: 80
End: 2025-06-06
Payer: MEDICARE

## 2025-06-06 VITALS — HEIGHT: 63 IN | WEIGHT: 128 LBS | BODY MASS INDEX: 22.68 KG/M2

## 2025-06-06 PROCEDURE — 99213 OFFICE O/P EST LOW 20 MIN: CPT

## 2025-06-06 PROCEDURE — 72100 X-RAY EXAM L-S SPINE 2/3 VWS: CPT

## 2025-06-06 RX ORDER — METHYLPREDNISOLONE 4 MG/1
4 TABLET ORAL
Qty: 1 | Refills: 1 | Status: ACTIVE | COMMUNITY
Start: 2025-06-06 | End: 1900-01-01

## 2025-06-24 ENCOUNTER — APPOINTMENT (OUTPATIENT)
Dept: ORTHOPEDIC SURGERY | Facility: CLINIC | Age: 80
End: 2025-06-24

## 2025-06-26 ENCOUNTER — APPOINTMENT (OUTPATIENT)
Dept: ORTHOPEDIC SURGERY | Facility: CLINIC | Age: 80
End: 2025-06-26
Payer: MEDICARE

## 2025-06-26 VITALS — BODY MASS INDEX: 22.68 KG/M2 | HEIGHT: 63 IN | WEIGHT: 128 LBS

## 2025-06-26 PROBLEM — M51.369 DISC DEGENERATION, LUMBAR: Status: ACTIVE | Noted: 2022-08-26

## 2025-06-26 PROCEDURE — 99213 OFFICE O/P EST LOW 20 MIN: CPT
